# Patient Record
Sex: FEMALE | Race: BLACK OR AFRICAN AMERICAN | NOT HISPANIC OR LATINO | Employment: OTHER | URBAN - METROPOLITAN AREA
[De-identification: names, ages, dates, MRNs, and addresses within clinical notes are randomized per-mention and may not be internally consistent; named-entity substitution may affect disease eponyms.]

---

## 2017-01-03 ENCOUNTER — APPOINTMENT (EMERGENCY)
Dept: RADIOLOGY | Facility: HOSPITAL | Age: 67
DRG: 683 | End: 2017-01-03
Payer: MEDICARE

## 2017-01-03 ENCOUNTER — HOSPITAL ENCOUNTER (INPATIENT)
Facility: HOSPITAL | Age: 67
LOS: 2 days | Discharge: HOME HEALTH | DRG: 683 | End: 2017-01-05
Attending: EMERGENCY MEDICINE | Admitting: INTERNAL MEDICINE
Payer: MEDICARE

## 2017-01-03 DIAGNOSIS — R07.9 CHEST PAIN: ICD-10-CM

## 2017-01-03 DIAGNOSIS — E46 HYPOALBUMINEMIA DUE TO PROTEIN-CALORIE MALNUTRITION (HCC): ICD-10-CM

## 2017-01-03 DIAGNOSIS — R62.7 FAILURE TO THRIVE IN ADULT: ICD-10-CM

## 2017-01-03 DIAGNOSIS — E88.09 HYPOALBUMINEMIA DUE TO PROTEIN-CALORIE MALNUTRITION (HCC): ICD-10-CM

## 2017-01-03 DIAGNOSIS — R26.2 AMBULATORY DYSFUNCTION: ICD-10-CM

## 2017-01-03 DIAGNOSIS — R60.9 EDEMA: Primary | ICD-10-CM

## 2017-01-03 DIAGNOSIS — E88.09 HYPOALBUMINEMIA: ICD-10-CM

## 2017-01-03 DIAGNOSIS — E11.9 TYPE 2 DIABETES MELLITUS (HCC): ICD-10-CM

## 2017-01-03 LAB
ALBUMIN SERPL BCP-MCNC: 2.3 G/DL (ref 3.5–5)
ALP SERPL-CCNC: 89 U/L (ref 46–116)
ALT SERPL W P-5'-P-CCNC: 30 U/L (ref 12–78)
ANION GAP SERPL CALCULATED.3IONS-SCNC: 5 MMOL/L (ref 4–13)
ANION GAP SERPL CALCULATED.3IONS-SCNC: 5 MMOL/L (ref 4–13)
AST SERPL W P-5'-P-CCNC: 69 U/L (ref 5–45)
ATRIAL RATE: 61 BPM
ATRIAL RATE: 71 BPM
BACTERIA UR QL AUTO: ABNORMAL /HPF
BACTERIA UR QL AUTO: ABNORMAL /HPF
BASOPHILS # BLD AUTO: 0.02 THOUSANDS/ΜL (ref 0–0.1)
BASOPHILS NFR BLD AUTO: 0 % (ref 0–1)
BILIRUB SERPL-MCNC: 0.39 MG/DL (ref 0.2–1)
BILIRUB UR QL STRIP: NEGATIVE
BILIRUB UR QL STRIP: NEGATIVE
BUN SERPL-MCNC: 35 MG/DL (ref 5–25)
BUN SERPL-MCNC: 35 MG/DL (ref 5–25)
CALCIUM SERPL-MCNC: 8.1 MG/DL (ref 8.3–10.1)
CALCIUM SERPL-MCNC: 8.1 MG/DL (ref 8.3–10.1)
CHLORIDE SERPL-SCNC: 105 MMOL/L (ref 100–108)
CHLORIDE SERPL-SCNC: 105 MMOL/L (ref 100–108)
CLARITY UR: ABNORMAL
CLARITY UR: CLEAR
CO2 SERPL-SCNC: 27 MMOL/L (ref 21–32)
CO2 SERPL-SCNC: 27 MMOL/L (ref 21–32)
COLOR UR: YELLOW
COLOR UR: YELLOW
CREAT SERPL-MCNC: 1.27 MG/DL (ref 0.6–1.3)
CREAT SERPL-MCNC: 1.27 MG/DL (ref 0.6–1.3)
CREAT UR-MCNC: 24.2 MG/DL
EOSINOPHIL # BLD AUTO: 0.01 THOUSAND/ΜL (ref 0–0.61)
EOSINOPHIL NFR BLD AUTO: 0 % (ref 0–6)
ERYTHROCYTE [DISTWIDTH] IN BLOOD BY AUTOMATED COUNT: 14.4 % (ref 11.6–15.1)
GFR SERPL CREATININE-BSD FRML MDRD: 50.7 ML/MIN/1.73SQ M
GFR SERPL CREATININE-BSD FRML MDRD: 50.7 ML/MIN/1.73SQ M
GLUCOSE SERPL-MCNC: 87 MG/DL (ref 65–140)
GLUCOSE SERPL-MCNC: 87 MG/DL (ref 65–140)
GLUCOSE UR STRIP-MCNC: NEGATIVE MG/DL
GLUCOSE UR STRIP-MCNC: NEGATIVE MG/DL
HCT VFR BLD AUTO: 31.7 % (ref 34.8–46.1)
HGB BLD-MCNC: 10.6 G/DL (ref 11.5–15.4)
HGB UR QL STRIP.AUTO: NEGATIVE
HGB UR QL STRIP.AUTO: NEGATIVE
KETONES UR STRIP-MCNC: NEGATIVE MG/DL
KETONES UR STRIP-MCNC: NEGATIVE MG/DL
LEUKOCYTE ESTERASE UR QL STRIP: ABNORMAL
LEUKOCYTE ESTERASE UR QL STRIP: ABNORMAL
LYMPHOCYTES # BLD AUTO: 2.26 THOUSANDS/ΜL (ref 0.6–4.47)
LYMPHOCYTES NFR BLD AUTO: 20 % (ref 14–44)
MCH RBC QN AUTO: 32.1 PG (ref 26.8–34.3)
MCHC RBC AUTO-ENTMCNC: 33.4 G/DL (ref 31.4–37.4)
MCV RBC AUTO: 96 FL (ref 82–98)
MONOCYTES # BLD AUTO: 0.59 THOUSAND/ΜL (ref 0.17–1.22)
MONOCYTES NFR BLD AUTO: 5 % (ref 4–12)
NEUTROPHILS # BLD AUTO: 8.37 THOUSANDS/ΜL (ref 1.85–7.62)
NEUTS SEG NFR BLD AUTO: 75 % (ref 43–75)
NITRITE UR QL STRIP: NEGATIVE
NITRITE UR QL STRIP: NEGATIVE
NON-SQ EPI CELLS URNS QL MICRO: ABNORMAL /HPF
NON-SQ EPI CELLS URNS QL MICRO: ABNORMAL /HPF
NRBC BLD AUTO-RTO: 0 /100 WBCS
NT-PROBNP SERPL-MCNC: 2549 PG/ML
P AXIS: 39 DEGREES
P AXIS: 44 DEGREES
PH UR STRIP.AUTO: 5.5 [PH] (ref 4.5–8)
PH UR STRIP.AUTO: 6 [PH] (ref 4.5–8)
PLATELET # BLD AUTO: 464 THOUSANDS/UL (ref 149–390)
PMV BLD AUTO: 9.6 FL (ref 8.9–12.7)
POTASSIUM SERPL-SCNC: 5.3 MMOL/L (ref 3.5–5.3)
POTASSIUM SERPL-SCNC: 5.3 MMOL/L (ref 3.5–5.3)
PR INTERVAL: 112 MS
PR INTERVAL: 118 MS
PROT SERPL-MCNC: 6.9 G/DL (ref 6.4–8.2)
PROT UR STRIP-MCNC: NEGATIVE MG/DL
PROT UR STRIP-MCNC: NEGATIVE MG/DL
PROT UR-MCNC: 13 MG/DL
PROT/CREAT UR: 0.54 MG/G{CREAT} (ref 0–0.1)
QRS AXIS: 2 DEGREES
QRS AXIS: 31 DEGREES
QRSD INTERVAL: 74 MS
QRSD INTERVAL: 82 MS
QT INTERVAL: 368 MS
QT INTERVAL: 396 MS
QTC INTERVAL: 398 MS
QTC INTERVAL: 399 MS
RBC # BLD AUTO: 3.3 MILLION/UL (ref 3.81–5.12)
RBC #/AREA URNS AUTO: ABNORMAL /HPF
RBC #/AREA URNS AUTO: ABNORMAL /HPF
SODIUM SERPL-SCNC: 137 MMOL/L (ref 136–145)
SODIUM SERPL-SCNC: 137 MMOL/L (ref 136–145)
SP GR UR STRIP.AUTO: 1.01 (ref 1–1.03)
SP GR UR STRIP.AUTO: 1.01 (ref 1–1.03)
SPECIMEN SOURCE: NORMAL
SPECIMEN SOURCE: NORMAL
T WAVE AXIS: 3 DEGREES
T WAVE AXIS: 35 DEGREES
TROPONIN I BLD-MCNC: 0 NG/ML (ref 0–0.08)
TROPONIN I BLD-MCNC: 0.01 NG/ML (ref 0–0.08)
UROBILINOGEN UR QL STRIP.AUTO: 0.2 E.U./DL
UROBILINOGEN UR QL STRIP.AUTO: 0.2 E.U./DL
VENTRICULAR RATE: 61 BPM
VENTRICULAR RATE: 71 BPM
WBC # BLD AUTO: 11.4 THOUSAND/UL (ref 4.31–10.16)
WBC #/AREA URNS AUTO: ABNORMAL /HPF
WBC #/AREA URNS AUTO: ABNORMAL /HPF

## 2017-01-03 PROCEDURE — 80048 BASIC METABOLIC PNL TOTAL CA: CPT

## 2017-01-03 PROCEDURE — 99285 EMERGENCY DEPT VISIT HI MDM: CPT

## 2017-01-03 PROCEDURE — 81001 URINALYSIS AUTO W/SCOPE: CPT

## 2017-01-03 PROCEDURE — 94760 N-INVAS EAR/PLS OXIMETRY 1: CPT

## 2017-01-03 PROCEDURE — 36415 COLL VENOUS BLD VENIPUNCTURE: CPT

## 2017-01-03 PROCEDURE — 84484 ASSAY OF TROPONIN QUANT: CPT

## 2017-01-03 PROCEDURE — 81002 URINALYSIS NONAUTO W/O SCOPE: CPT | Performed by: EMERGENCY MEDICINE

## 2017-01-03 PROCEDURE — 87086 URINE CULTURE/COLONY COUNT: CPT | Performed by: INTERNAL MEDICINE

## 2017-01-03 PROCEDURE — 84156 ASSAY OF PROTEIN URINE: CPT | Performed by: INTERNAL MEDICINE

## 2017-01-03 PROCEDURE — 81001 URINALYSIS AUTO W/SCOPE: CPT | Performed by: INTERNAL MEDICINE

## 2017-01-03 PROCEDURE — 82570 ASSAY OF URINE CREATININE: CPT | Performed by: INTERNAL MEDICINE

## 2017-01-03 PROCEDURE — 93005 ELECTROCARDIOGRAM TRACING: CPT | Performed by: EMERGENCY MEDICINE

## 2017-01-03 PROCEDURE — 85025 COMPLETE CBC W/AUTO DIFF WBC: CPT

## 2017-01-03 PROCEDURE — 93005 ELECTROCARDIOGRAM TRACING: CPT

## 2017-01-03 PROCEDURE — 80053 COMPREHEN METABOLIC PANEL: CPT | Performed by: EMERGENCY MEDICINE

## 2017-01-03 PROCEDURE — 71020 HB CHEST X-RAY 2VW FRONTAL&LATL: CPT

## 2017-01-03 PROCEDURE — 83880 ASSAY OF NATRIURETIC PEPTIDE: CPT | Performed by: EMERGENCY MEDICINE

## 2017-01-03 PROCEDURE — 87086 URINE CULTURE/COLONY COUNT: CPT

## 2017-01-03 RX ORDER — MULTIVIT WITH MINERALS/LUTEIN
1000 TABLET ORAL DAILY
COMMUNITY

## 2017-01-03 RX ORDER — TRIAMCINOLONE ACETONIDE 1 MG/G
CREAM TOPICAL 2 TIMES DAILY
Status: DISCONTINUED | OUTPATIENT
Start: 2017-01-03 | End: 2017-01-05 | Stop reason: HOSPADM

## 2017-01-03 RX ORDER — OXYCODONE HYDROCHLORIDE 5 MG/1
5 TABLET ORAL ONCE
Status: COMPLETED | OUTPATIENT
Start: 2017-01-03 | End: 2017-01-03

## 2017-01-03 RX ORDER — MECLIZINE HYDROCHLORIDE 25 MG/1
25 TABLET ORAL
COMMUNITY

## 2017-01-03 RX ORDER — PANTOPRAZOLE SODIUM 40 MG/1
40 TABLET, DELAYED RELEASE ORAL
Status: DISCONTINUED | OUTPATIENT
Start: 2017-01-04 | End: 2017-01-05 | Stop reason: HOSPADM

## 2017-01-03 RX ORDER — ONDANSETRON 2 MG/ML
4 INJECTION INTRAMUSCULAR; INTRAVENOUS EVERY 6 HOURS PRN
Status: DISCONTINUED | OUTPATIENT
Start: 2017-01-03 | End: 2017-01-05 | Stop reason: HOSPADM

## 2017-01-03 RX ORDER — PANTOPRAZOLE SODIUM 40 MG/1
TABLET, DELAYED RELEASE ORAL
COMMUNITY
Start: 2016-08-26

## 2017-01-03 RX ORDER — OXYCODONE HYDROCHLORIDE 5 MG/1
TABLET ORAL
Status: COMPLETED
Start: 2017-01-03 | End: 2017-01-03

## 2017-01-03 RX ORDER — TIZANIDINE 2 MG/1
1 TABLET ORAL EVERY 8 HOURS PRN
Status: DISCONTINUED | OUTPATIENT
Start: 2017-01-03 | End: 2017-01-05 | Stop reason: HOSPADM

## 2017-01-03 RX ORDER — ALLOPURINOL 100 MG/1
100 TABLET ORAL
COMMUNITY
Start: 2016-09-22

## 2017-01-03 RX ORDER — ASPIRIN 81 MG/1
324 TABLET, CHEWABLE ORAL ONCE
Status: COMPLETED | OUTPATIENT
Start: 2017-01-03 | End: 2017-01-03

## 2017-01-03 RX ORDER — GABAPENTIN 100 MG/1
100 CAPSULE ORAL
Status: DISCONTINUED | OUTPATIENT
Start: 2017-01-03 | End: 2017-01-05 | Stop reason: HOSPADM

## 2017-01-03 RX ORDER — GABAPENTIN 100 MG/1
100 CAPSULE ORAL
COMMUNITY
Start: 2016-12-27

## 2017-01-03 RX ORDER — OXYCODONE AND ACETAMINOPHEN 7.5; 325 MG/1; MG/1
1 TABLET ORAL
COMMUNITY
Start: 2015-02-28

## 2017-01-03 RX ORDER — ATENOLOL 25 MG/1
25 TABLET ORAL DAILY
Status: DISCONTINUED | OUTPATIENT
Start: 2017-01-04 | End: 2017-01-05 | Stop reason: HOSPADM

## 2017-01-03 RX ORDER — MINERAL OIL AND PETROLATUM 150; 830 MG/G; MG/G
OINTMENT OPHTHALMIC
Status: DISCONTINUED | OUTPATIENT
Start: 2017-01-03 | End: 2017-01-05 | Stop reason: HOSPADM

## 2017-01-03 RX ORDER — LOVASTATIN 40 MG/1
40 TABLET ORAL DAILY
COMMUNITY
Start: 2016-12-23

## 2017-01-03 RX ORDER — POTASSIUM CHLORIDE 20 MEQ/1
20 TABLET, EXTENDED RELEASE ORAL
COMMUNITY
Start: 2016-06-10

## 2017-01-03 RX ORDER — HEPARIN SODIUM 5000 [USP'U]/ML
5000 INJECTION, SOLUTION INTRAVENOUS; SUBCUTANEOUS EVERY 8 HOURS SCHEDULED
Status: DISCONTINUED | OUTPATIENT
Start: 2017-01-03 | End: 2017-01-05 | Stop reason: HOSPADM

## 2017-01-03 RX ORDER — ATENOLOL 50 MG/1
25 TABLET ORAL
COMMUNITY
Start: 2016-05-16

## 2017-01-03 RX ORDER — MECLIZINE HYDROCHLORIDE 25 MG/1
25 TABLET ORAL EVERY 8 HOURS PRN
Status: DISCONTINUED | OUTPATIENT
Start: 2017-01-03 | End: 2017-01-05 | Stop reason: HOSPADM

## 2017-01-03 RX ORDER — TIZANIDINE 4 MG/1
TABLET ORAL
COMMUNITY
Start: 2016-11-16

## 2017-01-03 RX ORDER — TRIAMCINOLONE ACETONIDE 1 MG/G
CREAM TOPICAL
COMMUNITY
Start: 2016-08-03

## 2017-01-03 RX ORDER — TRIAMTERENE AND HYDROCHLOROTHIAZIDE 37.5; 25 MG/1; MG/1
1 CAPSULE ORAL
COMMUNITY
Start: 2016-07-27

## 2017-01-03 RX ORDER — POTASSIUM CHLORIDE 20 MEQ/1
20 TABLET, EXTENDED RELEASE ORAL ONCE
Status: COMPLETED | OUTPATIENT
Start: 2017-01-03 | End: 2017-01-03

## 2017-01-03 RX ORDER — PRAVASTATIN SODIUM 20 MG
20 TABLET ORAL
Status: DISCONTINUED | OUTPATIENT
Start: 2017-01-03 | End: 2017-01-05 | Stop reason: HOSPADM

## 2017-01-03 RX ORDER — OXYCODONE HYDROCHLORIDE AND ACETAMINOPHEN 5; 325 MG/1; MG/1
1.5 TABLET ORAL EVERY 4 HOURS PRN
Status: DISCONTINUED | OUTPATIENT
Start: 2017-01-03 | End: 2017-01-05 | Stop reason: HOSPADM

## 2017-01-03 RX ORDER — CARBOXYMETHYLCELLULOSE SODIUM 10 MG/ML
1 GEL OPHTHALMIC
COMMUNITY

## 2017-01-03 RX ORDER — ACETAMINOPHEN 325 MG/1
650 TABLET ORAL EVERY 6 HOURS PRN
Status: DISCONTINUED | OUTPATIENT
Start: 2017-01-03 | End: 2017-01-05 | Stop reason: HOSPADM

## 2017-01-03 RX ADMIN — OXYCODONE HYDROCHLORIDE 5 MG: 5 TABLET ORAL at 15:47

## 2017-01-03 RX ADMIN — POTASSIUM CHLORIDE 20 MEQ: 1500 TABLET, EXTENDED RELEASE ORAL at 21:45

## 2017-01-03 RX ADMIN — SODIUM CHLORIDE 500 ML: 0.9 INJECTION, SOLUTION INTRAVENOUS at 16:02

## 2017-01-03 RX ADMIN — OXYCODONE HYDROCHLORIDE AND ACETAMINOPHEN 1.5 TABLET: 5; 325 TABLET ORAL at 19:01

## 2017-01-03 RX ADMIN — GABAPENTIN 100 MG: 100 CAPSULE ORAL at 22:38

## 2017-01-03 RX ADMIN — HEPARIN SODIUM 5000 UNITS: 5000 INJECTION, SOLUTION INTRAVENOUS; SUBCUTANEOUS at 22:38

## 2017-01-03 RX ADMIN — ASPIRIN 81 MG 324 MG: 81 TABLET ORAL at 11:03

## 2017-01-03 RX ADMIN — PRAVASTATIN SODIUM 20 MG: 20 TABLET ORAL at 21:45

## 2017-01-04 ENCOUNTER — APPOINTMENT (INPATIENT)
Dept: NON INVASIVE DIAGNOSTICS | Facility: HOSPITAL | Age: 67
DRG: 683 | End: 2017-01-04
Payer: MEDICARE

## 2017-01-04 LAB
ALBUMIN SERPL BCP-MCNC: 2 G/DL (ref 3.5–5)
ALP SERPL-CCNC: 83 U/L (ref 46–116)
ALT SERPL W P-5'-P-CCNC: 23 U/L (ref 12–78)
ANION GAP SERPL CALCULATED.3IONS-SCNC: 10 MMOL/L (ref 4–13)
AST SERPL W P-5'-P-CCNC: 21 U/L (ref 5–45)
ATRIAL RATE: 71 BPM
BACTERIA UR CULT: NORMAL
BACTERIA UR CULT: NORMAL
BILIRUB SERPL-MCNC: 0.2 MG/DL (ref 0.2–1)
BUN SERPL-MCNC: 36 MG/DL (ref 5–25)
CALCIUM SERPL-MCNC: 7.7 MG/DL (ref 8.3–10.1)
CHLORIDE SERPL-SCNC: 112 MMOL/L (ref 100–108)
CHOLEST SERPL-MCNC: 125 MG/DL (ref 50–200)
CO2 SERPL-SCNC: 25 MMOL/L (ref 21–32)
CREAT SERPL-MCNC: 1.23 MG/DL (ref 0.6–1.3)
ERYTHROCYTE [DISTWIDTH] IN BLOOD BY AUTOMATED COUNT: 14.6 % (ref 11.6–15.1)
GFR SERPL CREATININE-BSD FRML MDRD: 52.6 ML/MIN/1.73SQ M
GLUCOSE SERPL-MCNC: 71 MG/DL (ref 65–140)
HCT VFR BLD AUTO: 28.5 % (ref 34.8–46.1)
HDLC SERPL-MCNC: 86 MG/DL (ref 40–60)
HGB BLD-MCNC: 9.5 G/DL (ref 11.5–15.4)
LDLC SERPL CALC-MCNC: 23 MG/DL (ref 0–100)
MCH RBC QN AUTO: 31.9 PG (ref 26.8–34.3)
MCHC RBC AUTO-ENTMCNC: 33.3 G/DL (ref 31.4–37.4)
MCV RBC AUTO: 96 FL (ref 82–98)
P AXIS: 52 DEGREES
PLATELET # BLD AUTO: 424 THOUSANDS/UL (ref 149–390)
PLATELET # BLD AUTO: 425 THOUSANDS/UL (ref 149–390)
PMV BLD AUTO: 9.5 FL (ref 8.9–12.7)
PMV BLD AUTO: 9.5 FL (ref 8.9–12.7)
POTASSIUM SERPL-SCNC: 3.4 MMOL/L (ref 3.5–5.3)
PR INTERVAL: 116 MS
PROT SERPL-MCNC: 5.4 G/DL (ref 6.4–8.2)
QRS AXIS: 33 DEGREES
QRSD INTERVAL: 78 MS
QT INTERVAL: 386 MS
QTC INTERVAL: 419 MS
RBC # BLD AUTO: 2.98 MILLION/UL (ref 3.81–5.12)
SODIUM SERPL-SCNC: 147 MMOL/L (ref 136–145)
T WAVE AXIS: 47 DEGREES
TRIGL SERPL-MCNC: 78 MG/DL
TROPONIN I SERPL-MCNC: 0.03 NG/ML
TROPONIN I SERPL-MCNC: <0.02 NG/ML
TROPONIN I SERPL-MCNC: <0.02 NG/ML
VENTRICULAR RATE: 71 BPM
WBC # BLD AUTO: 11.72 THOUSAND/UL (ref 4.31–10.16)

## 2017-01-04 PROCEDURE — 85027 COMPLETE CBC AUTOMATED: CPT | Performed by: INTERNAL MEDICINE

## 2017-01-04 PROCEDURE — G8978 MOBILITY CURRENT STATUS: HCPCS

## 2017-01-04 PROCEDURE — 84484 ASSAY OF TROPONIN QUANT: CPT | Performed by: INTERNAL MEDICINE

## 2017-01-04 PROCEDURE — 84484 ASSAY OF TROPONIN QUANT: CPT | Performed by: SPECIALIST

## 2017-01-04 PROCEDURE — 80061 LIPID PANEL: CPT | Performed by: INTERNAL MEDICINE

## 2017-01-04 PROCEDURE — 85049 AUTOMATED PLATELET COUNT: CPT | Performed by: INTERNAL MEDICINE

## 2017-01-04 PROCEDURE — 80053 COMPREHEN METABOLIC PANEL: CPT | Performed by: INTERNAL MEDICINE

## 2017-01-04 PROCEDURE — 97163 PT EVAL HIGH COMPLEX 45 MIN: CPT

## 2017-01-04 PROCEDURE — 83036 HEMOGLOBIN GLYCOSYLATED A1C: CPT | Performed by: INTERNAL MEDICINE

## 2017-01-04 PROCEDURE — 93306 TTE W/DOPPLER COMPLETE: CPT

## 2017-01-04 PROCEDURE — G8979 MOBILITY GOAL STATUS: HCPCS

## 2017-01-04 RX ADMIN — OXYCODONE HYDROCHLORIDE AND ACETAMINOPHEN 1.5 TABLET: 5; 325 TABLET ORAL at 13:02

## 2017-01-04 RX ADMIN — HEPARIN SODIUM 5000 UNITS: 5000 INJECTION, SOLUTION INTRAVENOUS; SUBCUTANEOUS at 06:10

## 2017-01-04 RX ADMIN — PRAVASTATIN SODIUM 20 MG: 20 TABLET ORAL at 17:16

## 2017-01-04 RX ADMIN — OXYCODONE HYDROCHLORIDE AND ACETAMINOPHEN 1.5 TABLET: 5; 325 TABLET ORAL at 04:43

## 2017-01-04 RX ADMIN — OXYCODONE HYDROCHLORIDE AND ACETAMINOPHEN 1.5 TABLET: 5; 325 TABLET ORAL at 21:56

## 2017-01-04 RX ADMIN — TRIAMCINOLONE ACETONIDE: 1 CREAM TOPICAL at 08:55

## 2017-01-04 RX ADMIN — GABAPENTIN 100 MG: 100 CAPSULE ORAL at 21:56

## 2017-01-04 RX ADMIN — HEPARIN SODIUM 5000 UNITS: 5000 INJECTION, SOLUTION INTRAVENOUS; SUBCUTANEOUS at 13:01

## 2017-01-04 RX ADMIN — MINERAL OIL AND WHITE PETROLATUM 1 APPLICATION: 150; 830 OINTMENT OPHTHALMIC at 19:03

## 2017-01-04 RX ADMIN — TRIAMCINOLONE ACETONIDE: 1 CREAM TOPICAL at 17:16

## 2017-01-04 RX ADMIN — OXYCODONE HYDROCHLORIDE AND ACETAMINOPHEN 1.5 TABLET: 5; 325 TABLET ORAL at 08:59

## 2017-01-04 RX ADMIN — HEPARIN SODIUM 5000 UNITS: 5000 INJECTION, SOLUTION INTRAVENOUS; SUBCUTANEOUS at 21:56

## 2017-01-04 RX ADMIN — ATENOLOL 25 MG: 25 TABLET ORAL at 09:01

## 2017-01-04 RX ADMIN — PANTOPRAZOLE SODIUM 40 MG: 40 TABLET, DELAYED RELEASE ORAL at 06:10

## 2017-01-05 VITALS
HEART RATE: 64 BPM | TEMPERATURE: 98.7 F | DIASTOLIC BLOOD PRESSURE: 66 MMHG | WEIGHT: 160 LBS | SYSTOLIC BLOOD PRESSURE: 134 MMHG | RESPIRATION RATE: 20 BRPM | BODY MASS INDEX: 25.71 KG/M2 | HEIGHT: 66 IN | OXYGEN SATURATION: 96 %

## 2017-01-05 PROBLEM — R07.9 CHEST PAIN: Status: RESOLVED | Noted: 2017-01-03 | Resolved: 2017-01-05

## 2017-01-05 LAB
ANION GAP SERPL CALCULATED.3IONS-SCNC: 7 MMOL/L (ref 4–13)
BASOPHILS # BLD AUTO: 0.03 THOUSANDS/ΜL (ref 0–0.1)
BASOPHILS NFR BLD AUTO: 0 % (ref 0–1)
BUN SERPL-MCNC: 31 MG/DL (ref 5–25)
CALCIUM SERPL-MCNC: 8.1 MG/DL (ref 8.3–10.1)
CHLORIDE SERPL-SCNC: 109 MMOL/L (ref 100–108)
CO2 SERPL-SCNC: 29 MMOL/L (ref 21–32)
CREAT SERPL-MCNC: 0.96 MG/DL (ref 0.6–1.3)
EOSINOPHIL # BLD AUTO: 0.03 THOUSAND/ΜL (ref 0–0.61)
EOSINOPHIL NFR BLD AUTO: 0 % (ref 0–6)
ERYTHROCYTE [DISTWIDTH] IN BLOOD BY AUTOMATED COUNT: 14.8 % (ref 11.6–15.1)
GFR SERPL CREATININE-BSD FRML MDRD: >60 ML/MIN/1.73SQ M
GLUCOSE SERPL-MCNC: 71 MG/DL (ref 65–140)
HCT VFR BLD AUTO: 28.9 % (ref 34.8–46.1)
HGB BLD-MCNC: 9.7 G/DL (ref 11.5–15.4)
LYMPHOCYTES # BLD AUTO: 2.75 THOUSANDS/ΜL (ref 0.6–4.47)
LYMPHOCYTES NFR BLD AUTO: 30 % (ref 14–44)
MCH RBC QN AUTO: 32.7 PG (ref 26.8–34.3)
MCHC RBC AUTO-ENTMCNC: 33.6 G/DL (ref 31.4–37.4)
MCV RBC AUTO: 97 FL (ref 82–98)
MONOCYTES # BLD AUTO: 0.56 THOUSAND/ΜL (ref 0.17–1.22)
MONOCYTES NFR BLD AUTO: 6 % (ref 4–12)
NEUTROPHILS # BLD AUTO: 5.78 THOUSANDS/ΜL (ref 1.85–7.62)
NEUTS SEG NFR BLD AUTO: 64 % (ref 43–75)
NRBC BLD AUTO-RTO: 0 /100 WBCS
PLATELET # BLD AUTO: 363 THOUSANDS/UL (ref 149–390)
PMV BLD AUTO: 9.5 FL (ref 8.9–12.7)
POTASSIUM SERPL-SCNC: 3.3 MMOL/L (ref 3.5–5.3)
RBC # BLD AUTO: 2.97 MILLION/UL (ref 3.81–5.12)
SODIUM SERPL-SCNC: 145 MMOL/L (ref 136–145)
WBC # BLD AUTO: 9.23 THOUSAND/UL (ref 4.31–10.16)

## 2017-01-05 PROCEDURE — G8989 SELF CARE D/C STATUS: HCPCS

## 2017-01-05 PROCEDURE — 80048 BASIC METABOLIC PNL TOTAL CA: CPT | Performed by: NURSE PRACTITIONER

## 2017-01-05 PROCEDURE — 97530 THERAPEUTIC ACTIVITIES: CPT

## 2017-01-05 PROCEDURE — G8988 SELF CARE GOAL STATUS: HCPCS

## 2017-01-05 PROCEDURE — 97165 OT EVAL LOW COMPLEX 30 MIN: CPT

## 2017-01-05 PROCEDURE — 85025 COMPLETE CBC W/AUTO DIFF WBC: CPT | Performed by: NURSE PRACTITIONER

## 2017-01-05 PROCEDURE — 97116 GAIT TRAINING THERAPY: CPT

## 2017-01-05 PROCEDURE — G8987 SELF CARE CURRENT STATUS: HCPCS

## 2017-01-05 RX ORDER — POTASSIUM CHLORIDE 20 MEQ/1
20 TABLET, EXTENDED RELEASE ORAL DAILY
Status: DISCONTINUED | OUTPATIENT
Start: 2017-01-06 | End: 2017-01-05 | Stop reason: HOSPADM

## 2017-01-05 RX ORDER — POTASSIUM CHLORIDE 20 MEQ/1
40 TABLET, EXTENDED RELEASE ORAL ONCE
Status: COMPLETED | OUTPATIENT
Start: 2017-01-05 | End: 2017-01-05

## 2017-01-05 RX ORDER — DOCUSATE SODIUM 100 MG/1
100 CAPSULE, LIQUID FILLED ORAL 2 TIMES DAILY
Status: DISCONTINUED | OUTPATIENT
Start: 2017-01-05 | End: 2017-01-05 | Stop reason: HOSPADM

## 2017-01-05 RX ADMIN — HEPARIN SODIUM 5000 UNITS: 5000 INJECTION, SOLUTION INTRAVENOUS; SUBCUTANEOUS at 14:39

## 2017-01-05 RX ADMIN — PANTOPRAZOLE SODIUM 40 MG: 40 TABLET, DELAYED RELEASE ORAL at 05:40

## 2017-01-05 RX ADMIN — HEPARIN SODIUM 5000 UNITS: 5000 INJECTION, SOLUTION INTRAVENOUS; SUBCUTANEOUS at 05:40

## 2017-01-05 RX ADMIN — TRIAMCINOLONE ACETONIDE: 1 CREAM TOPICAL at 09:49

## 2017-01-05 RX ADMIN — POTASSIUM CHLORIDE 40 MEQ: 1500 TABLET, EXTENDED RELEASE ORAL at 09:49

## 2017-01-05 RX ADMIN — OXYCODONE HYDROCHLORIDE AND ACETAMINOPHEN 1.5 TABLET: 5; 325 TABLET ORAL at 09:52

## 2017-01-05 RX ADMIN — ATENOLOL 25 MG: 25 TABLET ORAL at 09:48

## 2017-01-12 LAB
EST. AVERAGE GLUCOSE BLD GHB EST-MCNC: 77 MG/DL
HBA1C MFR BLD: 4.3 % (ref 4.2–6.3)

## 2017-01-25 DIAGNOSIS — K91.2 POSTSURGICAL MALABSORPTION, NOT ELSEWHERE CLASSIFIED: ICD-10-CM

## 2017-01-25 DIAGNOSIS — Z98.84 BARIATRIC SURGERY STATUS: ICD-10-CM

## 2017-01-25 DIAGNOSIS — R63.8 OTHER SYMPTOMS AND SIGNS CONCERNING FOOD AND FLUID INTAKE: ICD-10-CM

## 2017-01-25 DIAGNOSIS — Z00.00 ENCOUNTER FOR GENERAL ADULT MEDICAL EXAMINATION WITHOUT ABNORMAL FINDINGS: ICD-10-CM

## 2017-01-27 ENCOUNTER — ALLSCRIPTS OFFICE VISIT (OUTPATIENT)
Dept: OTHER | Facility: OTHER | Age: 67
End: 2017-01-27

## 2017-02-01 ENCOUNTER — GENERIC CONVERSION - ENCOUNTER (OUTPATIENT)
Dept: OTHER | Facility: OTHER | Age: 67
End: 2017-02-01

## 2017-02-02 ENCOUNTER — GENERIC CONVERSION - ENCOUNTER (OUTPATIENT)
Dept: OTHER | Facility: OTHER | Age: 67
End: 2017-02-02

## 2017-02-06 ENCOUNTER — HOSPITAL ENCOUNTER (EMERGENCY)
Facility: HOSPITAL | Age: 67
Discharge: HOME | End: 2017-02-06
Attending: EMERGENCY MEDICINE | Admitting: EMERGENCY MEDICINE
Payer: MEDICARE

## 2017-02-06 ENCOUNTER — APPOINTMENT (EMERGENCY)
Dept: RADIOLOGY | Facility: HOSPITAL | Age: 67
End: 2017-02-06
Payer: MEDICARE

## 2017-02-06 VITALS
RESPIRATION RATE: 18 BRPM | BODY MASS INDEX: 24.11 KG/M2 | SYSTOLIC BLOOD PRESSURE: 149 MMHG | DIASTOLIC BLOOD PRESSURE: 83 MMHG | OXYGEN SATURATION: 98 % | HEIGHT: 66 IN | HEART RATE: 98 BPM | WEIGHT: 150 LBS | TEMPERATURE: 98.4 F

## 2017-02-06 DIAGNOSIS — R10.9 ABDOMINAL PAIN: Primary | ICD-10-CM

## 2017-02-06 DIAGNOSIS — R91.1 PULMONARY NODULE: ICD-10-CM

## 2017-02-06 DIAGNOSIS — R07.81 RIB PAIN: ICD-10-CM

## 2017-02-06 LAB
ANION GAP SERPL CALCULATED.3IONS-SCNC: 7 MMOL/L (ref 4–13)
ATRIAL RATE: 63 BPM
BASOPHILS # BLD AUTO: 0.02 THOUSANDS/ΜL (ref 0–0.1)
BASOPHILS NFR BLD AUTO: 0 % (ref 0–1)
BUN SERPL-MCNC: 31 MG/DL (ref 5–25)
CALCIUM SERPL-MCNC: 9.2 MG/DL (ref 8.3–10.1)
CHLORIDE SERPL-SCNC: 110 MMOL/L (ref 100–108)
CO2 SERPL-SCNC: 25 MMOL/L (ref 21–32)
CREAT SERPL-MCNC: 1.41 MG/DL (ref 0.6–1.3)
EOSINOPHIL # BLD AUTO: 0.01 THOUSAND/ΜL (ref 0–0.61)
EOSINOPHIL NFR BLD AUTO: 0 % (ref 0–6)
ERYTHROCYTE [DISTWIDTH] IN BLOOD BY AUTOMATED COUNT: 13.9 % (ref 11.6–15.1)
GFR SERPL CREATININE-BSD FRML MDRD: 45 ML/MIN/1.73SQ M
GLUCOSE SERPL-MCNC: 78 MG/DL (ref 65–140)
HCT VFR BLD AUTO: 35.4 % (ref 34.8–46.1)
HGB BLD-MCNC: 11.4 G/DL (ref 11.5–15.4)
LYMPHOCYTES # BLD AUTO: 2.28 THOUSANDS/ΜL (ref 0.6–4.47)
LYMPHOCYTES NFR BLD AUTO: 25 % (ref 14–44)
MCH RBC QN AUTO: 31.6 PG (ref 26.8–34.3)
MCHC RBC AUTO-ENTMCNC: 32.2 G/DL (ref 31.4–37.4)
MCV RBC AUTO: 98 FL (ref 82–98)
MONOCYTES # BLD AUTO: 0.6 THOUSAND/ΜL (ref 0.17–1.22)
MONOCYTES NFR BLD AUTO: 7 % (ref 4–12)
NEUTROPHILS # BLD AUTO: 6.13 THOUSANDS/ΜL (ref 1.85–7.62)
NEUTS SEG NFR BLD AUTO: 68 % (ref 43–75)
NRBC BLD AUTO-RTO: 0 /100 WBCS
NT-PROBNP SERPL-MCNC: 881 PG/ML
P AXIS: 48 DEGREES
PLATELET # BLD AUTO: 303 THOUSANDS/UL (ref 149–390)
PMV BLD AUTO: 10.2 FL (ref 8.9–12.7)
POTASSIUM SERPL-SCNC: 4.3 MMOL/L (ref 3.5–5.3)
PR INTERVAL: 124 MS
QRS AXIS: 36 DEGREES
QRSD INTERVAL: 74 MS
QT INTERVAL: 374 MS
QTC INTERVAL: 382 MS
RBC # BLD AUTO: 3.61 MILLION/UL (ref 3.81–5.12)
SODIUM SERPL-SCNC: 142 MMOL/L (ref 136–145)
T WAVE AXIS: 39 DEGREES
TROPONIN I SERPL-MCNC: <0.02 NG/ML
VENTRICULAR RATE: 63 BPM
WBC # BLD AUTO: 9.09 THOUSAND/UL (ref 4.31–10.16)

## 2017-02-06 PROCEDURE — 84484 ASSAY OF TROPONIN QUANT: CPT | Performed by: EMERGENCY MEDICINE

## 2017-02-06 PROCEDURE — 80048 BASIC METABOLIC PNL TOTAL CA: CPT | Performed by: EMERGENCY MEDICINE

## 2017-02-06 PROCEDURE — 83880 ASSAY OF NATRIURETIC PEPTIDE: CPT | Performed by: EMERGENCY MEDICINE

## 2017-02-06 PROCEDURE — 71020 HB CHEST X-RAY 2VW FRONTAL&LATL: CPT

## 2017-02-06 PROCEDURE — 99285 EMERGENCY DEPT VISIT HI MDM: CPT

## 2017-02-06 PROCEDURE — 74176 CT ABD & PELVIS W/O CONTRAST: CPT

## 2017-02-06 PROCEDURE — 85025 COMPLETE CBC W/AUTO DIFF WBC: CPT | Performed by: EMERGENCY MEDICINE

## 2017-02-06 PROCEDURE — 93005 ELECTROCARDIOGRAM TRACING: CPT

## 2017-02-06 PROCEDURE — 36415 COLL VENOUS BLD VENIPUNCTURE: CPT | Performed by: EMERGENCY MEDICINE

## 2017-02-06 RX ORDER — OXYCODONE HYDROCHLORIDE AND ACETAMINOPHEN 5; 325 MG/1; MG/1
0.5 TABLET ORAL ONCE
Status: COMPLETED | OUTPATIENT
Start: 2017-02-06 | End: 2017-02-06

## 2017-02-06 RX ORDER — OXYCODONE HYDROCHLORIDE AND ACETAMINOPHEN 5; 325 MG/1; MG/1
1 TABLET ORAL ONCE
Status: COMPLETED | OUTPATIENT
Start: 2017-02-06 | End: 2017-02-06

## 2017-02-06 RX ADMIN — OXYCODONE HYDROCHLORIDE AND ACETAMINOPHEN 1 TABLET: 5; 325 TABLET ORAL at 20:47

## 2017-02-06 RX ADMIN — IOHEXOL 100 ML: 350 INJECTION, SOLUTION INTRAVENOUS at 18:43

## 2017-02-06 RX ADMIN — IOHEXOL 50 ML: 240 INJECTION, SOLUTION INTRATHECAL; INTRAVASCULAR; INTRAVENOUS; ORAL at 17:32

## 2017-02-06 RX ADMIN — OXYCODONE HYDROCHLORIDE AND ACETAMINOPHEN 0.5 TABLET: 5; 325 TABLET ORAL at 20:48

## 2017-02-07 ENCOUNTER — GENERIC CONVERSION - ENCOUNTER (OUTPATIENT)
Dept: OTHER | Facility: OTHER | Age: 67
End: 2017-02-07

## 2017-02-09 ENCOUNTER — ALLSCRIPTS OFFICE VISIT (OUTPATIENT)
Dept: OTHER | Facility: OTHER | Age: 67
End: 2017-02-09

## 2017-02-27 ENCOUNTER — GENERIC CONVERSION - ENCOUNTER (OUTPATIENT)
Dept: OTHER | Facility: OTHER | Age: 67
End: 2017-02-27

## 2017-03-24 ENCOUNTER — ALLSCRIPTS OFFICE VISIT (OUTPATIENT)
Dept: OTHER | Facility: OTHER | Age: 67
End: 2017-03-24

## 2017-04-11 ENCOUNTER — ANESTHESIA EVENT (OUTPATIENT)
Dept: GASTROENTEROLOGY | Facility: HOSPITAL | Age: 67
End: 2017-04-11

## 2017-04-11 NOTE — ANESTHESIA PREPROCEDURE EVALUATION
Patient summary reviewed and Nursing notes reviewed  Mallampati: II  TM distance: <3 FB  Neck ROM: full    PULMONARYPULMONARY Positives: sleep apnea    NEURO/PSYCH  Additional Neuro/Psych Comments: ANXIETY    ENDO/OTHER  Additional Endo/Other Comments: ARTHRITIS  DJD  MORBID OBESITY  HYPOALBUMINEMIA SECONDARY TO MALNUTRITION  HX ANEMIA  HYPERPARATHYROIDISM   ENDO/OTHER Negatives: diabetes mellitus    DENTAL  - normal    CARDIOVASCULAR  CARDIOVASCULAR Positives: regular normalhypertension - well controlled     Comments:  Additional Cardiovascular Comments: HLD    GI/HEPATIC/RENAL  GI/HEPATIC/RENAL Positives: GERD well controlled,   Addition GI/Hepatic/Renal Comments: DYSPEPSIA  MALABSORPTION SYNDROME    Anesthesia type: general  intravenous induction   Risks, benefits, and alternatives discussed with patient.  ASA 3

## 2017-04-12 ENCOUNTER — ANESTHESIA (OUTPATIENT)
Dept: GASTROENTEROLOGY | Facility: HOSPITAL | Age: 67
End: 2017-04-12

## 2017-04-28 DIAGNOSIS — E16.1 OTHER HYPOGLYCEMIA: ICD-10-CM

## 2017-04-28 DIAGNOSIS — N25.81 SECONDARY HYPERPARATHYROIDISM OF RENAL ORIGIN (HCC): ICD-10-CM

## 2017-04-28 DIAGNOSIS — Z98.84 BARIATRIC SURGERY STATUS: ICD-10-CM

## 2017-04-28 DIAGNOSIS — K91.2 POSTSURGICAL MALABSORPTION, NOT ELSEWHERE CLASSIFIED: ICD-10-CM

## 2017-06-21 ENCOUNTER — ALLSCRIPTS OFFICE VISIT (OUTPATIENT)
Dept: OTHER | Facility: OTHER | Age: 67
End: 2017-06-21

## 2017-06-21 ENCOUNTER — GENERIC CONVERSION - ENCOUNTER (OUTPATIENT)
Dept: OTHER | Facility: OTHER | Age: 67
End: 2017-06-21

## 2017-06-21 DIAGNOSIS — Z00.00 ENCOUNTER FOR GENERAL ADULT MEDICAL EXAMINATION WITHOUT ABNORMAL FINDINGS: ICD-10-CM

## 2017-06-21 DIAGNOSIS — R63.8 OTHER SYMPTOMS AND SIGNS CONCERNING FOOD AND FLUID INTAKE: ICD-10-CM

## 2017-06-21 DIAGNOSIS — Z98.84 BARIATRIC SURGERY STATUS: ICD-10-CM

## 2017-06-21 DIAGNOSIS — K91.2 POSTSURGICAL MALABSORPTION, NOT ELSEWHERE CLASSIFIED: ICD-10-CM

## 2017-06-21 DIAGNOSIS — N25.81 SECONDARY HYPERPARATHYROIDISM OF RENAL ORIGIN (HCC): ICD-10-CM

## 2017-06-27 ENCOUNTER — GENERIC CONVERSION - ENCOUNTER (OUTPATIENT)
Dept: OTHER | Facility: OTHER | Age: 67
End: 2017-06-27

## 2017-10-02 ENCOUNTER — GENERIC CONVERSION - ENCOUNTER (OUTPATIENT)
Dept: OTHER | Facility: OTHER | Age: 67
End: 2017-10-02

## 2018-01-09 NOTE — PROGRESS NOTES
Active Problems    1  Anxiety disorder (300 00) (F41 9)   2  Backache (724 5) (M54 9)   3  Balance problems (781 99) (R26 89)   4  Diverticulitis of colon (562 11) (K57 32)   5  Dyspepsia (536 8) (K30)   6  Encounter for gynecological examination (V72 31) (Z01 419)   7  Esophagitis (530 10) (K20 9)   8  Gout (274 9) (M10 9)   9  Herniated nucleus pulposus, L5-S1 (722 10) (M51 27)   10  Hypercholesterolemia (272 0) (E78 00)   11  Hypertension (401 9) (I10)   12  Inadequate oral intake (783 9) (R63 8)   13  Lightheadedness (780 4) (R42)   14  Obstructive sleep apnea (327 23) (G47 33)   15  Osteoarthritis (715 90) (M19 90)   16  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 3) (K91 2,Z90 3)   17  Pre-operative cardiovascular examination (V72 81) (Z01 810)   18  Reactive hypoglycemia (251 2) (E16 1)   19  Sebaceous cyst (706 2) (L72 3)   20  Secondary hyperparathyroidism (588 81) (N25 81)   21  Status post gastric bypass for obesity (V45 86) (Z98 84)    Current Meds   1  Allopurinol 100 MG Oral Tablet; TAKE 1 TABLET DAILY; Therapy: (Recorded:01Bgv8833) to Recorded   2  Atenolol 50 MG Oral Tablet; Therapy: (Recorded:22Oct2013) to Recorded   3  Bisacodyl 5 MG TBEC Recorded   4  Calcium Citrate TABS; Therapy: (A[place]ia [place]) to Recorded   5  Crotamiton 10 % LOTN Recorded   6  Daily Multivitamin TABS Recorded   7  Gabapentin 100 MG Oral Capsule Recorded   8  HM Vitamin B1 TABS Recorded   9  Klor-Con M20 20 MEQ Oral Tablet Extended Release; Therapy: (Recorded:22Oct2013) to Recorded   10  Lovastatin 40 MG Oral Tablet; TAKE 1 TABLET DAILY; Therapy: (Recorded:23Ofp2730) to Recorded   11  Meclizine HCl - 25 MG Oral Tablet Recorded   12  Oxycodone-Acetaminophen 5-325 MG Oral Tablet; TAKE 1 TABLET EVERY 6 HOURS    AS NEEDED; Therapy: (SITONEWH:58QVG1743) to Recorded   13  Pantoprazole Sodium 40 MG Oral Tablet Delayed Release Recorded   14  Stool Softener TABS; Therapy: (Recorded:65Xrb1319) to Recorded   15   TiZANidine HCl - 4 MG Oral Tablet Recorded   16  Triamterene-HCTZ 37 5-25 MG Oral Tablet; i po monday, wednesday, friday; Therapy: (Recorded:02Qxt0025) to Recorded   17  Vitamin B-12 1000 MCG Oral Tablet Recorded   18  Vitamin C 500 MG Oral Capsule Recorded   19  Vitamin D3 5000 UNIT Oral Capsule Recorded   20  Vitamin D3 CAPS; Therapy: (Recorded:30Cta3266) to Recorded    Allergies    1  No Known Drug Allergies    Discussion/Summary    Called pt to schedule follow-up  Pt states she was just in the ER with chest pains yesterday and states that they found a leak in her RNY  Pt now has apt with surgeon scheduled for this Thursday, 2/9/17 at 10:45am  Scheduled pt for nutrition appointment for 11:15 same day        Signatures   Electronically signed by : INDER Jimenez RD; Feb 7 2017 12:12PM EST                       (Author)

## 2018-01-10 NOTE — PROGRESS NOTES
Message  Received referral from AMNI Bolanos to follow-up with pt for possible hypoglycemia  Called pt,  answered phone and took down name and contact # for pt to return call  Active Problems    1  Anxiety disorder (300 00) (F41 9)   2  Backache (724 5) (M54 9)   3  Balance problems (781 99) (R26 89)   4  Diverticulitis of colon (562 11) (K57 32)   5  Dyspepsia (536 8) (K30)   6  Encounter for gynecological examination (V72 31) (Z01 419)   7  Esophagitis (530 10) (K20 9)   8  Gout (274 9) (M10 9)   9  Herniated nucleus pulposus, L5-S1 (722 10) (M51 27)   10  Hypercholesterolemia (272 0) (E78 00)   11  Hypertension (401 9) (I10)   12  Inadequate oral intake (783 9) (R63 8)   13  Lightheadedness (780 4) (R42)   14  Obstructive sleep apnea (327 23) (G47 33)   15  Osteoarthritis (715 90) (M19 90)   16  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 3) (K91 2,Z90 3)   17  Pre-operative cardiovascular examination (V72 81) (Z01 810)   18  Reactive hypoglycemia (251 2) (E16 1)   19  Sebaceous cyst (706 2) (L72 3)   20  Secondary hyperparathyroidism (588 81) (N25 81)   21  Status post gastric bypass for obesity (V45 86) (Z98 84)    Current Meds   1  Allopurinol 100 MG Oral Tablet; TAKE 1 TABLET DAILY; Therapy: (Recorded:63Khd8320) to Recorded   2  Atenolol 50 MG Oral Tablet; Therapy: (Recorded:22Oct2013) to Recorded   3  Bisacodyl 5 MG TBEC Recorded   4  Calcium Citrate TABS; Therapy: (Flori Kruger) to Recorded   5  Crotamiton 10 % LOTN Recorded   6  Daily Multivitamin TABS Recorded   7  Gabapentin 100 MG Oral Capsule Recorded   8  HM Vitamin B1 TABS Recorded   9  Klor-Con M20 20 MEQ Oral Tablet Extended Release; Therapy: (Recorded:22Oct2013) to Recorded   10  Lovastatin 40 MG Oral Tablet; TAKE 1 TABLET DAILY; Therapy: (Recorded:18Bgq0410) to Recorded   11  Meclizine HCl - 25 MG Oral Tablet Recorded   12  Oxycodone-Acetaminophen 5-325 MG Oral Tablet; TAKE 1 TABLET EVERY 6 HOURS    AS NEEDED;     Therapy: (IWSVODRL:29JFM3699) to Recorded   13  Pantoprazole Sodium 40 MG Oral Tablet Delayed Release Recorded   14  Stool Softener TABS; Therapy: (Recorded:22Zqy3555) to Recorded   15  TiZANidine HCl - 4 MG Oral Tablet Recorded   16  Triamterene-HCTZ 37 5-25 MG Oral Tablet; i po monday, wednesday, friday; Therapy: (Recorded:67Vaq3061) to Recorded   17  Vitamin B-12 1000 MCG Oral Tablet Recorded   18  Vitamin C 500 MG Oral Capsule Recorded   19  Vitamin D3 5000 UNIT Oral Capsule Recorded   20  Vitamin D3 CAPS; Therapy: (Recorded:09Sqv6759) to Recorded    Allergies    1   No Known Drug Allergies    Signatures   Electronically signed by : INDER Chawla RD; Feb 2 2017  9:18AM EST                       (Author)

## 2018-01-12 VITALS
TEMPERATURE: 98.9 F | HEIGHT: 66 IN | SYSTOLIC BLOOD PRESSURE: 120 MMHG | BODY MASS INDEX: 26.36 KG/M2 | DIASTOLIC BLOOD PRESSURE: 90 MMHG | HEART RATE: 75 BPM | RESPIRATION RATE: 12 BRPM | WEIGHT: 164 LBS

## 2018-01-12 VITALS
WEIGHT: 143 LBS | HEIGHT: 66 IN | SYSTOLIC BLOOD PRESSURE: 120 MMHG | DIASTOLIC BLOOD PRESSURE: 92 MMHG | BODY MASS INDEX: 22.98 KG/M2 | HEART RATE: 84 BPM

## 2018-01-12 VITALS — WEIGHT: 164 LBS | BODY MASS INDEX: 26.47 KG/M2

## 2018-01-12 NOTE — MISCELLANEOUS
Message   Recorded as Task   Date: 06/23/2017 01:17 PM, Created By: Jeannie Bolanos   Task Name: Review Note   Assigned To: Shawn Perdomo   Regarding Patient: Erna Lester, Status: Active   Comment:    Jeannie Bolanos - 23 Jun 2017 1:17 PM     TASK CREATED  just fyi - patient has had further episodes of hypoglycemia and also has been diagnosed with LUNG Ca  She was having "explosive" diarrhea per SNF and unfortunately the doctor there also stopped her acarbose  Salvador Arora (SANDRA)   Thanks Salvador Arora  I do not seen any BG logs, nor the blood test as ordered on March 2017  There are no fu appointments in future either    PLs fu in office for us to provide care with prior labs      Signatures   Electronically signed by : ABHIJEET Navarro ; Jun 27 2017  3:12PM EST                       (Author)

## 2018-01-14 VITALS
WEIGHT: 148.5 LBS | HEIGHT: 66 IN | SYSTOLIC BLOOD PRESSURE: 112 MMHG | HEART RATE: 88 BPM | TEMPERATURE: 98.3 F | BODY MASS INDEX: 23.87 KG/M2 | DIASTOLIC BLOOD PRESSURE: 80 MMHG | RESPIRATION RATE: 18 BRPM

## 2018-01-15 NOTE — RESULT NOTES
Message   Dear Maximo Solis,  I tried to call your home number to leave a message but the answering machine is not taking messages  I was in touch with your Endocrinologist regarding concerns for your low blood sugars  Dr Elizabet Del Valle is your Endocrinologist  he indicated that they had wanted you to get blood sugar logs-(which means to keep track of your blood sugars) and have a follow-up in their office to help you with this  I am writing to you on behalf of their office  Since you are currently in a skilled nursing facility, perhaps the nurses can keep a blood sugar log for you and you can bring this to a follow-up with Dr Vincent Morrison office  I would recommend this for you  Since you have seen him in the past, please call his office to make a follow-up visit  If you do not have their phone number, you can let me know and I can try to find it for you  Thank you      José Antonio Olivier   [facility]   [phone]            Signatures   Electronically signed by : Barbara [facility]; Jun 27 2017  3:43PM EST                       (Author)

## 2018-01-15 NOTE — PROGRESS NOTES
Discussion/Summary  Discussion Summary:   Assess: Pt here for 4 year post-op RNY nutrition follow-up per PA request  Pt currently resides in skilled nursing facility  Pt states that she eats whatever is provided to her by the SNF for meals and also states she eats almost all of her meals  Pt states her meals will always contain a meat, vegetable, starch, and fruit  Pt states she takes whatever vitamins are provided to her by the SNF, and denies refusing vitamins as was previously noted  Pt states her recent syncopal episode was immediately after finishing a meal   Diagnose: Inappropriate types of foods related to food and nutrition related knowledge deficit vs lack of motivation to change as evidenced by pt reports of drinking fruit juice and soda post-RNY and episodes of reactive hypoglycemia after eating  (NEW)  Intervene: Provided pt with printed education materials on carbohydrate counting, carbohydrate foods, and appropriate portion sizes  Discussed simple sugars vs complex carbohydrates with pt and discussed reactive hypoglycemia  Monitor/Evaluate: Will monitor pts progress, weight, food journals, and available bloodwork at next follow-up  Active Problems    1  Abdominal pain (789 00) (R10 9)   2  Anxiety disorder (300 00) (F41 9)   3  Backache (724 5) (M54 9)   4  Balance problems (781 99) (R26 89)   5  Diverticulitis of colon (562 11) (K57 32)   6  Dyspepsia (536 8) (K30)   7  Encounter for gynecological examination (V72 31) (Z01 419)   8  Esophagitis (530 10) (K20 9)   9  Gout (274 9) (M10 9)   10  Herniated nucleus pulposus, L5-S1 (722 10) (M51 27)   11  Hypercholesterolemia (272 0) (E78 00)   12  Hypertension (401 9) (I10)   13  Inadequate oral intake (783 9) (R63 8)   14  Lightheadedness (780 4) (R42)   15  Obstructive sleep apnea (327 23) (G47 33)   16  Osteoarthritis (715 90) (M19 90)   17  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 3) (K91 2,Z90 3)   18   Pre-operative cardiovascular examination (V72 81) (Z01 810)   19  Pulmonary embolism (415 19) (I26 99)   20  Pulmonary nodule (793 11) (R91 1)   21  Reactive hypoglycemia (251 2) (E16 1)   22  Sebaceous cyst (706 2) (L72 3)   23  Secondary hyperparathyroidism (588 81) (N25 81)   24  Status post gastric bypass for obesity (V45 86) (X58 37)    Past Medical History    1  History of Altered mental status (780 97) (R41 82)   2  History of Ataxia (781 3) (R27 0)   3  History of Denial Of Any Significant Medical History   4  History of Helicobacter pylori (H  pylori) infection (041 86) (A04 8)   5  History of edema (V13 89) (Z87 898)   6  History of hypertension (V12 59) (Z86 79)   7  History of nicotine dependence (V15 82) (Z87 891)   8  History of obesity (V13 89) (Z86 39)   9  History of Type 2 diabetes mellitus (250 00) (E11 9)    Surgical History    1  History of Gastric Surgery For Morbid Obesity Gastric Bypass   2  History of Hemorrhoidectomy   3  History of Leg Repair   4  History of Tubal Ligation    Family History  Mother    1  Family history of Colon Cancer (V16 0)   2  Maternal history of Diabetes Mellitus (V18 0)   3  Family history of Hypertension (V17 49)   4  Family history of Reported Family History Of Heart Disease  Father    5  Family history of Colon Cancer (V16 0)   6  Paternal history of Diabetes Mellitus (V18 0)   7  Family history of Hypertension (V17 49)  Sister    6  Sororal history of Diabetes Mellitus (V18 0)   9  Family history of Hypertension (V17 49)  Brother    8  Family history of Hypertension (V17 49)    Social History    · Denied: History of Alcohol Use (History)   · History of Current Smoker (305 1)   · Denied: History of Drug Use   · Former smoker (V15 82) (N09 854)   · Marital History - Currently    · Never Used Drugs   · Uses Safety Equipment - Seatbelts    Current Meds   1  Allopurinol 100 MG Oral Tablet; TAKE 1 TABLET DAILY; Therapy: (Recorded:38Hoj3212) to Recorded   2   Atenolol 50 MG Oral Tablet; TAKE 1 TABLET DAILY; Therapy: (Recorded:24Mar2017) to Recorded   3  B1 Natural 250 MG Oral Tablet; 1 Tab daily; Therapy: (Recorded:24Mar2017) to Recorded   4  Calcium 600+D 600-800 MG-UNIT Oral Tablet; Take 3 tablet daily; Therapy: (Recorded:24Mar2017) to Recorded   5  Daily Multivitamin TABS; TAKE 1 TABLET DAILY; Therapy: (Recorded:24Mar2017) to Recorded   6  Glucose 4 GM Oral Tablet Chewable; Chew 2 tabs if BS <70  Recheck sugar in 15   minutes  If still <70, chew 4 more glucose tabs  Recheck BS in 15 minutes; Therapy: 34PTO2407 to (Evaluate:20Sep2017); Last Rx:24Mar2017 Ordered   7  Klor-Con M20 20 MEQ Oral Tablet Extended Release; TAKE 1 TABLET DAILY; Therapy: (Recorded:24Mar2017) to Recorded   8  Lovastatin 40 MG Oral Tablet; TAKE 1 TABLET DAILY; Therapy: (Recorded:85Kxz4981) to Recorded   9  TiZANidine HCl - 4 MG Oral Tablet; TAKE 1 TABLET 3 TIMES DAILY AS NEEDED; Therapy: (Recorded:24Mar2017) to Recorded   10  Triamterene-HCTZ 37 5-25 MG Oral Tablet; take 1 tablet every other day; Therapy: (Recorded:24Mar2017) to Recorded   11  Vitamin B-12 1000 MCG Oral Tablet; TAKE 1 TABLET DAILY AS DIRECTED; Therapy: (Recorded:24Mar2017) to Recorded   12  Vitamin C 500 MG Oral Capsule; 2 CAPSULES DAILY; Therapy: (Recorded:24Mar2017) to Recorded   13  Vitamin D3 5000 UNIT Oral Capsule; Take one tab daily as directed; Therapy: (Recorded:24Mar2017) to Recorded    Allergies    1  No Known Drug Allergies    Vitals  Signs   Recorded: 21Jun2017 04:24PM   Weight: 164 lb   BMI Calculated: 26 47  BSA Calculated: 1 84    Future Appointments    Date/Time Provider Specialty Site   10/02/2017 02:30 PM Ruspantini, Mallissa Goodpasture, [facility] General Surgery [facility]     Signatures   Electronically signed by : INDER Brown RD; Jun 22 2017  2:58PM EST                       (Author)    Electronically signed by :  ABHIJEET Taylor ; Jun 23 2017 12:41PM EST

## 2018-01-16 NOTE — PROGRESS NOTES
Active Problems    1  Anxiety disorder (300 00) (F41 9)   2  Backache (724 5) (M54 9)   3  Balance problems (781 99) (R26 89)   4  Diverticulitis of colon (562 11) (K57 32)   5  Dyspepsia (536 8) (K30)   6  Encounter for gynecological examination (V72 31) (Z01 419)   7  Esophagitis (530 10) (K20 9)   8  Gout (274 9) (M10 9)   9  Herniated nucleus pulposus, L5-S1 (722 10) (M51 27)   10  Hypercholesterolemia (272 0) (E78 00)   11  Hypertension (401 9) (I10)   12  Inadequate oral intake (783 9) (R63 8)   13  Lightheadedness (780 4) (R42)   14  Obstructive sleep apnea (327 23) (G47 33)   15  Osteoarthritis (715 90) (M19 90)   16  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 3) (K91 2,Z90 3)   17  Pre-operative cardiovascular examination (V72 81) (Z01 810)   18  Reactive hypoglycemia (251 2) (E16 1)   19  Sebaceous cyst (706 2) (L72 3)   20  Secondary hyperparathyroidism (588 81) (N25 81)   21  Status post gastric bypass for obesity (V45 86) (Z98 84)    Current Meds   1  Allopurinol 100 MG Oral Tablet; TAKE 1 TABLET DAILY; Therapy: (Recorded:43Tlv7819) to Recorded   2  Atenolol 50 MG Oral Tablet; Therapy: (Recorded:22Oct2013) to Recorded   3  Bisacodyl 5 MG TBEC Recorded   4  Calcium Citrate TABS; Therapy: (Khadijah Salazar) to Recorded   5  Crotamiton 10 % LOTN Recorded   6  Daily Multivitamin TABS Recorded   7  Gabapentin 100 MG Oral Capsule Recorded   8  HM Vitamin B1 TABS Recorded   9  Klor-Con M20 20 MEQ Oral Tablet Extended Release; Therapy: (Recorded:22Oct2013) to Recorded   10  Lovastatin 40 MG Oral Tablet; TAKE 1 TABLET DAILY; Therapy: (Recorded:60Dut4803) to Recorded   11  Meclizine HCl - 25 MG Oral Tablet Recorded   12  Oxycodone-Acetaminophen 5-325 MG Oral Tablet; TAKE 1 TABLET EVERY 6 HOURS    AS NEEDED; Therapy: (YEBMVMRT:13OKK2691) to Recorded   13  Pantoprazole Sodium 40 MG Oral Tablet Delayed Release Recorded   14  Stool Softener TABS; Therapy: (Recorded:09Qly1735) to Recorded   15   TiZANidine HCl - 4 MG Oral Tablet Recorded   16  Triamterene-HCTZ 37 5-25 MG Oral Tablet; i po monday, wednesday, friday; Therapy: (Recorded:67Rea9225) to Recorded   17  Vitamin B-12 1000 MCG Oral Tablet Recorded   18  Vitamin C 500 MG Oral Capsule Recorded   19  Vitamin D3 5000 UNIT Oral Capsule Recorded   20  Vitamin D3 CAPS; Therapy: (Recorded:34Kto3438) to Recorded    Allergies    1  No Known Drug Allergies    Discussion/Summary    Called pt to follow-up  Pt states she had a recent PET/CAT scan, and her Dr Sam Persons her to come into his office today to discuss results  Pt has next appointment with Dr Hina Cohn on 3/30/17   Scheduled nutrition follow-up for Thursday, 3/16/17 at 1:30pm       Signatures   Electronically signed by : INDER Mckeon RD; Feb 27 2017 10:25AM EST                       (Author)

## 2018-01-16 NOTE — PROGRESS NOTES
Discussion/Summary    Self Referrals: No      Chief Complaint  Patient in office today to follow up from 2 hospital visits for failure to thrive  Active Problems    1  Anxiety disorder (300 00) (F41 9)   2  Backache (724 5) (M54 9)   3  Diverticulitis of colon (562 11) (K57 32)   4  Dyspepsia (536 8) (K30)   5  Encounter for gynecological examination (V72 31) (Z01 419)   6  Esophagitis (530 10) (K20 9)   7  Gout (274 9) (M10 9)   8  Herniated nucleus pulposus, L5-S1 (722 10) (M51 27)   9  Hypercholesterolemia (272 0) (E78 00)   10  Hypertension (401 9) (I10)   11  Inadequate oral intake (783 9) (R63 8)   12  Lightheadedness (780 4) (R42)   13  Obesity (278 00) (E66 9)   14  Obstructive sleep apnea (327 23) (G47 33)   15  Osteoarthritis (715 90) (M19 90)   16  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 3) (K91 2,Z90 3)   17  Pre-operative cardiovascular examination (V72 81) (Z01 810)   18  Sebaceous cyst (706 2) (L72 3)   19  Secondary hyperparathyroidism (588 81) (N25 81)   20  Status post gastric bypass for obesity (V45 86) (Z98 84)    Social History    · Denied: History of Alcohol Use (History)   · History of Current Smoker (305 1)   · Denied: History of Drug Use   · Former smoker (V15 82) (J35 220)   · Marital History - Currently    · Never Used Drugs   · Uses Safety Equipment - Seatbelts    Current Meds   1  Allopurinol 100 MG Oral Tablet; TAKE 1 TABLET DAILY; Therapy: (Recorded:86Rau2531) to Recorded   2  Atenolol 50 MG Oral Tablet; Therapy: (Recorded:22Oct2013) to Recorded   3  Bisacodyl 5 MG TBEC Recorded   4  Calcium Citrate TABS; Therapy: (Mariella Woods) to Recorded   5  Crotamiton 10 % LOTN Recorded   6  Daily Multivitamin TABS Recorded   7  Gabapentin 100 MG Oral Capsule Recorded   8  HM Vitamin B1 TABS Recorded   9  Klor-Con M20 20 MEQ Oral Tablet Extended Release; Therapy: (Recorded:22Oct2013) to Recorded   10  Lovastatin 40 MG Oral Tablet; TAKE 1 TABLET DAILY;     Therapy: (Recorded:38Sje4778) to Recorded   11  Meclizine HCl - 25 MG Oral Tablet Recorded   12  Oxycodone-Acetaminophen 5-325 MG Oral Tablet; TAKE 1 TABLET EVERY 6 HOURS AS    NEEDED; Therapy: ([id number]) to Recorded   13  Pantoprazole Sodium 40 MG Oral Tablet Delayed Release Recorded   14  Stool Softener TABS; Therapy: (Recorded:97Kgs3833) to Recorded   15  TiZANidine HCl - 4 MG Oral Tablet Recorded   16  Triamterene-HCTZ 37 5-25 MG Oral Tablet; i po monday, wednesday, friday; Therapy: (Recorded:13Wtv8685) to Recorded   17  Vitamin B-12 1000 MCG Oral Tablet Recorded   18  Vitamin C 500 MG Oral Capsule Recorded   19  Vitamin D3 5000 UNIT Oral Capsule Recorded   20  Vitamin D3 CAPS; Therapy: (Recorded:91Hhg7463) to Recorded    Allergies    1  No Known Drug Allergies    Vitals   Recorded: 21IJJ9385 01:47PM   Temperature 97 9 F   Heart Rate 101   Respiration 25   Systolic 247   Diastolic 62   Height 5 ft 6 in   Weight 151 lb    BMI Calculated 24 37   BSA Calculated 1 78     Future Appointments    Date/Time Provider Specialty Site   10/02/2017 02:30 PM Mohit Bolanos, [facility] General Surgery [facility]     Signatures   Electronically signed by :  ABHIJEET Long ; 2017 10:01AM EST

## 2018-01-16 NOTE — RESULT NOTES
Signatures   Electronically signed by : Camila Dhaliwal, [facility]; Jun 27 2017  3:43PM EST                       (Author)

## 2018-01-18 NOTE — MISCELLANEOUS
Provider Comments  Provider Comments:   Dear Cristal Briggs had a scheduled appointment at our office today but did not show  We attempted to call you back but were unable to reach you  It is very important that you follow up with us so that we can assess your physical and nutritional safety after your surgery  Please call our office at [phone] to reschedule your appointment  Sincerely,     [facility]          Signatures   Electronically signed by :  ABHIJEET Diaz ; Oct  4 2017  8:20AM EST

## 2018-01-22 VITALS
SYSTOLIC BLOOD PRESSURE: 112 MMHG | HEART RATE: 101 BPM | WEIGHT: 151 LBS | HEIGHT: 66 IN | DIASTOLIC BLOOD PRESSURE: 62 MMHG | BODY MASS INDEX: 24.27 KG/M2 | TEMPERATURE: 97.9 F | RESPIRATION RATE: 25 BRPM

## 2019-02-11 ENCOUNTER — TELEPHONE (OUTPATIENT)
Dept: BARIATRICS | Facility: CLINIC | Age: 69
End: 2019-02-11

## 2019-02-11 NOTE — TELEPHONE ENCOUNTER
----- Message from Asa Interiano RN sent at 2019 10:08 AM EST -----  Regardin year f/u  Please contact patient to schedule a 5 year follow up appointment    Thank You